# Patient Record
Sex: MALE | Race: WHITE | NOT HISPANIC OR LATINO | URBAN - METROPOLITAN AREA
[De-identification: names, ages, dates, MRNs, and addresses within clinical notes are randomized per-mention and may not be internally consistent; named-entity substitution may affect disease eponyms.]

---

## 2017-04-14 ENCOUNTER — EMERGENCY (EMERGENCY)
Facility: HOSPITAL | Age: 57
LOS: 1 days | Discharge: HOME | End: 2017-04-14
Attending: EMERGENCY MEDICINE | Admitting: EMERGENCY MEDICINE
Payer: MEDICAID

## 2017-04-14 VITALS
TEMPERATURE: 98 F | RESPIRATION RATE: 18 BRPM | DIASTOLIC BLOOD PRESSURE: 99 MMHG | OXYGEN SATURATION: 100 % | HEART RATE: 74 BPM | SYSTOLIC BLOOD PRESSURE: 143 MMHG

## 2017-04-14 PROCEDURE — 99282 EMERGENCY DEPT VISIT SF MDM: CPT | Mod: 25

## 2017-04-14 NOTE — ED PROVIDER NOTE - MEDICAL DECISION MAKING DETAILS
56M with R T5 dermatomal rash consistent with herpes zoster. Rash is confined to a single dermatome. Pt presenting to ED because he was unaware of natural progression of disease and that rash would extend to anterior portion of T5 dermatome. Pt on appropriate treatment for herpes zoster. Will d/c.

## 2017-04-14 NOTE — ED ADULT NURSE NOTE - OBJECTIVE STATEMENT
Pt rec'd A&Ox3 c/o of pain from shingles, states was seen and rx meds x 2 days ago and now josué noted lesions from back to right side of chest. Pt states feels sharp pain to lesions. Denies any other medical complaints. MD evaluated.

## 2017-04-14 NOTE — ED ADULT TRIAGE NOTE - CHIEF COMPLAINT QUOTE
Seen in diff ER 2 days ago for rash on back that was dx as shingles. Pt is on valacyclovir x2 days. Tonight lesions spread to right side of chest. C/o pain to area of rash. Denies any other adverse symptoms.

## 2017-04-14 NOTE — ED PROVIDER NOTE - PROGRESS NOTE DETAILS
Pt counselled on expected progression of shingles. Instructed pt to continue taking antivirals as prescribed, follow up with PMD, return for new/worsening symptoms.

## 2017-04-14 NOTE — ED PROVIDER NOTE - OBJECTIVE STATEMENT
56M no sig PMH p/w chest wall pain and rash. The rash started 2 days ago, began on the back, approx T5 dermatome. Pt dx with shingles at outside ER and started on valacyclovir, which he has been taking. Tonight pt noted rash had spread in dermatomal distribution along the R lateral and anterior chest wall, associated with sharp pain. No fevers/chills, SOB, cough, N/V/D, abd pain, or dysuria. 56M no sig PMH p/w chest wall pain and rash. The rash started 2 days ago, began on the back, approx T5 dermatome. Pt dx with shingles at outside ER and started on valacyclovir, which he has been taking. Tonight pt noted rash had spread in dermatomal distribution along the R lateral and anterior chest wall, associated with sharp pain. No fevers/chills, SOB, cough, N/V/D, abd pain, or dysuria. No hx of immunosuppression.

## 2017-04-14 NOTE — ED PROVIDER NOTE - SKIN, MLM
vesicular rash on erythematous base in R T5 dermatome, no drainage, no cellulitis vesicular rash on erythematous base in R T5 dermatome, no drainage, no cellulitis, no abscess.

## 2017-04-14 NOTE — ED PROVIDER NOTE - ATTENDING CONTRIBUTION TO CARE
att55 y/o m recently diagnosed with herpes zoster and started on valcyclovir, presents with spread of rash in dermatome pattern to the r/anterior side of the lower chest wall. Pain is controlled with OTC medications per pt. No cp, no sob, no fever. No evidence of superficial cellulitis or abscess. Consistent with zoster dermatome affected and pt is already on therapy. Plan for d/c with f/u, continue medication. Pt agrees with the plan.   I have personally performed a face to face bedside history and physical examination of this patient. I have discussed the history, examination, review of systems, assessment and plan of management with the resident. I have reviewed the electronic medical record and amended it to reflect my history, review of systems, physical exam, assessment and plan.
